# Patient Record
Sex: MALE | Race: BLACK OR AFRICAN AMERICAN | Employment: UNEMPLOYED | URBAN - METROPOLITAN AREA
[De-identification: names, ages, dates, MRNs, and addresses within clinical notes are randomized per-mention and may not be internally consistent; named-entity substitution may affect disease eponyms.]

---
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
## 2025-03-18 ENCOUNTER — HOSPITAL ENCOUNTER (EMERGENCY)
Facility: HOSPITAL | Age: 20
Discharge: HOME | End: 2025-03-18

## 2025-03-18 VITALS
HEIGHT: 67 IN | DIASTOLIC BLOOD PRESSURE: 85 MMHG | OXYGEN SATURATION: 99 % | SYSTOLIC BLOOD PRESSURE: 136 MMHG | TEMPERATURE: 98.3 F | HEART RATE: 89 BPM | WEIGHT: 130 LBS | RESPIRATION RATE: 20 BRPM | BODY MASS INDEX: 20.4 KG/M2

## 2025-03-18 DIAGNOSIS — R44.0 AUDITORY HALLUCINATIONS: Primary | ICD-10-CM

## 2025-03-18 LAB
AMPHET UR QL SCN: NEGATIVE
ANION GAP SERPL CALC-SCNC: 10 MMOL/L (ref 3–18)
BARBITURATES UR QL SCN: NEGATIVE
BASOPHILS # BLD: 0.05 K/UL (ref 0–0.1)
BASOPHILS NFR BLD: 0.6 % (ref 0–2)
BENZODIAZ UR QL: NEGATIVE
BUN SERPL-MCNC: 20 MG/DL (ref 7–18)
BUN/CREAT SERPL: 20 (ref 12–20)
CALCIUM SERPL-MCNC: 8.6 MG/DL (ref 8.5–10.1)
CANNABINOIDS UR QL SCN: POSITIVE
CHLORIDE SERPL-SCNC: 100 MMOL/L (ref 100–111)
CO2 SERPL-SCNC: 25 MMOL/L (ref 21–32)
COCAINE UR QL SCN: NEGATIVE
CREAT SERPL-MCNC: 1.02 MG/DL (ref 0.6–1.3)
DIFFERENTIAL METHOD BLD: ABNORMAL
EOSINOPHIL # BLD: 0.07 K/UL (ref 0–0.4)
EOSINOPHIL NFR BLD: 0.8 % (ref 0–5)
ERYTHROCYTE [DISTWIDTH] IN BLOOD BY AUTOMATED COUNT: 12.6 % (ref 11.6–14.5)
ETHANOL SERPL-MCNC: <3 MG/DL (ref 0–3)
FLUAV RNA SPEC QL NAA+PROBE: NOT DETECTED
FLUBV RNA SPEC QL NAA+PROBE: NOT DETECTED
GLUCOSE SERPL-MCNC: 98 MG/DL (ref 74–99)
HCT VFR BLD AUTO: 43.6 % (ref 36–48)
HGB BLD-MCNC: 14.8 G/DL (ref 13–16)
IMM GRANULOCYTES # BLD AUTO: 0.02 K/UL (ref 0–0.04)
IMM GRANULOCYTES NFR BLD AUTO: 0.2 % (ref 0–0.5)
LYMPHOCYTES # BLD: 1.47 K/UL (ref 0.9–3.6)
LYMPHOCYTES NFR BLD: 16.4 % (ref 21–52)
Lab: ABNORMAL
MCH RBC QN AUTO: 28.3 PG (ref 24–34)
MCHC RBC AUTO-ENTMCNC: 33.9 G/DL (ref 31–37)
MCV RBC AUTO: 83.4 FL (ref 78–100)
METHADONE UR QL: NEGATIVE
MONOCYTES # BLD: 0.62 K/UL (ref 0.05–1.2)
MONOCYTES NFR BLD: 6.9 % (ref 3–10)
NEUTS SEG # BLD: 6.74 K/UL (ref 1.8–8)
NEUTS SEG NFR BLD: 75.1 % (ref 40–73)
NRBC # BLD: 0 K/UL (ref 0–0.01)
NRBC BLD-RTO: 0 PER 100 WBC
OPIATES UR QL: NEGATIVE
PCP UR QL: NEGATIVE
PLATELET # BLD AUTO: 256 K/UL (ref 135–420)
PMV BLD AUTO: 10.3 FL (ref 9.2–11.8)
POTASSIUM SERPL-SCNC: 3.5 MMOL/L (ref 3.5–5.5)
RBC # BLD AUTO: 5.23 M/UL (ref 4.35–5.65)
SARS-COV-2 RNA RESP QL NAA+PROBE: NOT DETECTED
SODIUM SERPL-SCNC: 135 MMOL/L (ref 136–145)
SOURCE: NORMAL
WBC # BLD AUTO: 9 K/UL (ref 4.6–13.2)

## 2025-03-18 PROCEDURE — 87636 SARSCOV2 & INF A&B AMP PRB: CPT

## 2025-03-18 PROCEDURE — 93005 ELECTROCARDIOGRAM TRACING: CPT | Performed by: EMERGENCY MEDICINE

## 2025-03-18 PROCEDURE — 80307 DRUG TEST PRSMV CHEM ANLYZR: CPT

## 2025-03-18 PROCEDURE — 99284 EMERGENCY DEPT VISIT MOD MDM: CPT

## 2025-03-18 PROCEDURE — 85025 COMPLETE CBC W/AUTO DIFF WBC: CPT

## 2025-03-18 PROCEDURE — 82077 ASSAY SPEC XCP UR&BREATH IA: CPT

## 2025-03-18 PROCEDURE — 80048 BASIC METABOLIC PNL TOTAL CA: CPT

## 2025-03-18 ASSESSMENT — PAIN - FUNCTIONAL ASSESSMENT: PAIN_FUNCTIONAL_ASSESSMENT: NONE - DENIES PAIN

## 2025-03-18 NOTE — ED NOTES
Pt belongings in locker 3 top [facility]  
Pt given cup for urine sample. Not able to urinate at this time.   
Urine collected and sent to lab  
\"UROBILINOGEN\", \"NITRU\", \"LEUKOCYTESUR\", \"WBCUA\", \"RBCUA\", \"BACTERIA\"  PREGNANCY TEST: No results found for: \"PREGTESTUR\"    Dialysis: No    Target Destination:     Insurance: Payor: /      Current Psychotic Symptoms  Harmful Actions Towards Others:    Orientation Level:    Speech Pattern:    General Attitude:    Emotions:    Delusions:    Hallucination:       Any Suicidal Ideations: Low Risk    Homicidal Ideations/Violence Risk:   Observed Violent Behavior:    Homicidal Ideations:      Past Psychiatric History: No past medical history on file.    Hold (TDO/Involuntary Hold): No    The patient is currently receiving care for the above psychiatric illness.     Home Medications  Does Patient Have Medications to Bring to the Facility: No  Medications Prior to Admission:   None          Additional Information  Isolation:      HIV Positive: unknown    Oxygen Use: No    Any Legal Issues (Current or Past): unknown    Does Patient have POA or Guardian: No    Current Living Situation:      Roommate Appropriate: Yes    Is this a special case or have any other considerations:  No  (pregnancy, autism, developmental disabled, etc.)    Does the patient have confirmed or suspected COVID-19 Symptoms: No  (if yes, test must be completed, if no test is not required)       Last COVID Lab SARS-CoV-2, PCR ( )   Date Value   03/18/2025 Not detected     Rapid Influenza A By PCR ( )   Date Value   03/18/2025 Not detected     Rapid Influenza B By PCR ( )   Date Value   03/18/2025 Not detected              Immunization status:   There is no immunization history on file for this patient.

## 2025-03-18 NOTE — ED PROVIDER NOTES
EMERGENCY DEPARTMENT HISTORY AND PHYSICAL EXAM      Date: 3/18/2025  Patient Name: Kwasi Silva    History of Presenting Illness     Chief Complaint   Patient presents with    Mental Health Problem       History (Context): Kwasi Silva is a 19 y.o. male with significant PMHx for schizophrenia presents ambulatory to the ED today. Patient reports intermittent myalgias. Denies CP, SOB, dizziness. Patient reports history of schizophrenia and he is not currently taking antipsychotics. Denies SI and HI.  Patient reports he is hearing voices that are having \"regular conversation with him\". Denies cough, congestion, fever, chills.       PCP: No primary care provider on file.    No current facility-administered medications for this encounter.     No current outpatient medications on file.       Past History     Past Medical History:   No past medical history on file.    Past Surgical History:  No past surgical history on file.    Family History:  No family history on file.    Social History:        Allergies:  No Known Allergies      Physical Exam     Vitals:    03/18/25 0627   BP: 136/85   Pulse: 89   Resp: 20   Temp: 98.3 °F (36.8 °C)   TempSrc: Oral   SpO2: 99%   Weight: 59 kg (130 lb)   Height: 1.702 m (5' 7\")       Physical Exam  Vitals and nursing note reviewed.   Constitutional:       Appearance: Normal appearance.      Comments: Pt in NAD   HENT:      Head: Normocephalic and atraumatic.   Eyes:      Conjunctiva/sclera: Conjunctivae normal.   Cardiovascular:      Rate and Rhythm: Normal rate and regular rhythm.   Pulmonary:      Effort: Pulmonary effort is normal.      Breath sounds: Normal breath sounds.   Abdominal:      General: Bowel sounds are normal.      Palpations: Abdomen is soft.   Musculoskeletal:      Cervical back: Full passive range of motion without pain.   Skin:     General: Skin is warm and moist.   Neurological:      Mental Status: He is alert.   Psychiatric:         Behavior: Behavior is

## 2025-03-18 NOTE — BSMART NOTE
Crisis Note: Dr. Peterson and Crisis called [facility] and spoke to Katherine, [phone], to discuss patient for inpatient psychiatric treatment; Dr. Peterson declined patient due to patient does not meet criteria for inpatient psychiatric treatment and recommended patient to be seen by an outpatient provider and was provided outpatient resources prior to discharge. ISAAK Quiles made aware outpatient resources.

## 2025-03-18 NOTE — VIRTUAL HEALTH
Kwasi Silva  [id number]  2005     Social Work Behavioral Health Crisis Assessment    03/18/25    Chief Complaint: Psychosis     HPI: Patient is a 19 y.o. Black /  male who presents for Psychosis . Patient presented to the ED on 03/18/25 from homeless.    Patient reported that he has been hearing voices, and has been having hallucinations of seeing animals. Patient reported that he has been seeing animals talk that aren't really there and they have been giving him commands. Patient reported that its hard to just walk around because the hallucinations are distracting. Patient reported that he didn't finish [employer], and due to this it has been difficult finding a job. Patient reported that he moved out of his family home two months ago, and doesn't want his family contacted. Patient reported he was living in a homeless shelter, but reported that he left because his hallucinations told him to. During assessment patient appeared to be responding to internal stimuli, and was looking around as if he saw objects in the room.       Due to patient having psychosis of symptoms effecting his daily life, This pt. would benefit from inpatient hospitalization, patient is agreeable.  Discussed recommendations regarding inpatient admission with ISAAK alexis    Past Psychiatric History:  Previous Diagnoses/symptoms: \" I think I'm schizophrenic\"   Previous suicide attempts/self-harm: felt Sucidal two days ago  Inpatient psychiatric hospitalizations: yes  Current outpatient psychiatric provider: Denies  Current therapist: States not in therapy  Previous psychiatric medication trials: No prior medication trials  Current psychiatric medications: No current psychiatric medications  Family Psychiatric History: Denies    Sleep Hours: \" not good\"    Sleep concerns: difficulty attaining sleep    Use of sleep medications: denies    Substance Abuse History:  Tobacco: Denies  Alcohol: Denies  Marijuana: Endorses

## 2025-03-18 NOTE — ED TRIAGE NOTES
Pt arrived via Triage ambulatory c/o chest, bilateral rib and abdominal pain that comes and goes. Pt is currently pain free. Pt also states he is schizophrenic and use to take something but didn't recall the name but stopped because he didn't feel like he needed it. Denies SI/HI at this time but did state he hears voices.

## 2025-03-18 NOTE — BSMART NOTE
Crisis Note: Spoke with the patient and informed him that telepsych recommended inpatient psychiatric treatment and educated him on the steps of inpatient services. Patient is still is receptive to treatment. Crisis will assist as needed.

## 2025-03-18 NOTE — BSMART NOTE
Crisis Note: Spoke with the ED provider and he reported that he will forward message to the ED charge to complete TC checklist to start/initiate bed search.

## 2025-03-19 LAB
EKG ATRIAL RATE: 71 BPM
EKG DIAGNOSIS: NORMAL
EKG P AXIS: 80 DEGREES
EKG P-R INTERVAL: 150 MS
EKG Q-T INTERVAL: 410 MS
EKG QRS DURATION: 80 MS
EKG QTC CALCULATION (BAZETT): 445 MS
EKG R AXIS: 84 DEGREES
EKG T AXIS: 53 DEGREES
EKG VENTRICULAR RATE: 71 BPM

## 2025-03-19 PROCEDURE — 93010 ELECTROCARDIOGRAM REPORT: CPT | Performed by: INTERNAL MEDICINE
